# Patient Record
Sex: MALE | Race: BLACK OR AFRICAN AMERICAN
[De-identification: names, ages, dates, MRNs, and addresses within clinical notes are randomized per-mention and may not be internally consistent; named-entity substitution may affect disease eponyms.]

---

## 2018-12-26 NOTE — ER DOCUMENT REPORT
ED GI/





- General


Chief Complaint: Penile Discharge


Stated Complaint: PENIAL DRAINAGE


Time Seen by Provider: 12/26/18 19:59


Mode of Arrival: Ambulatory


Information source: Patient


Notes: 





Patient is a 28-year-old male comes emergency room 2 complaints his primary 

complaint is he believes he has an STD.  #2 complaint is his right ankle hurts. 

Patient states that his cold is been going out with he believes she is 

contracted an STD most likely chlamydia he is started with a penile discharge 

that is not quite as yellow as what he remembers in his younger age more of a 

whitish kind of a discharge.  It all started approximately 4 days ago.  Patient 

furthermore states that she is being treated as well.  The second thing is that 

he was at work today while lifting a mattress above his head was on a slight 

hill as he lifted his right ankle rolled on him inverted and complains of pain 

and swelling.  He denies any injuries or fall.  He continued throughout the day 

to finish work but it is swollen more since then.  Patient is requesting that we

treat him for his STD and also just treat his ankle he does not want an x-ray.


TRAVEL OUTSIDE OF THE [place] IN LAST 30 DAYS: No





- HPI


Patient complains to provider of: Other - Penile discharge.  No: Testicular pain


Onset: Other


Timing/Duration: Gradual - 4 days


Quality of pain: No pain


Severity at maximum: Mild


Severity in ED: Mild


Pain Level: 1


Location: Other - Penis.  No: Left testicle, Right testicle


Sexual history: Active, New partner, Unprotected intercourse, STD exposure


Associated symptoms: Dysuria, Penile discharge


Exacerbated by: Denies


Relieved by: Denies


Similar symptoms previously: Yes


Recently seen / treated by doctor: No





- Related Data


Allergies/Adverse Reactions: 


                                        





phenytoin [From Dilantin] Allergy (Verified 12/26/18 18:04)


   











Past Medical History





- General


Information source: Patient





- Social History


Smoking Status: Current Every Day Smoker


Cigarette use (# per day): Yes - 1 pack a day


Chew tobacco use (# tins/day): No


Smoking Education Provided: Yes


Frequency of alcohol use: None


Drug Abuse: None


Lives with: Alone


Family History: Reviewed & Not Pertinent


Patient has suicidal ideation: No


Patient has homicidal ideation: No


Renal/ Medical History: Denies: Hx Peritoneal Dialysis





Review of Systems





- Review of Systems


Constitutional: No symptoms reported


EENT: No symptoms reported


Cardiovascular: No symptoms reported


Respiratory: No symptoms reported


Gastrointestinal: No symptoms reported


Genitourinary: See HPI, Dysuria


Male Genitourinary: See HPI, Penile discharge


Musculoskeletal: See HPI, Joint pain, Joint swelling, Ankle swelling


Skin: No symptoms reported


Hematologic/Lymphatic: No symptoms reported


Neurological/Psychological: No symptoms reported


-: Yes All other systems reviewed and negative





Physical Exam





- Vital signs


Vitals: 


                                        











Temp Pulse Resp BP Pulse Ox


 


 98.7 F   81   17   114/62   100 


 


 12/26/18 18:32  12/26/18 18:32  12/26/18 18:32  12/26/18 18:32  12/26/18 18:32











Interpretation: Normal





- Notes


Notes: 





PHYSICAL EXAMINATION:





GENERAL:  patient is a well-nourished well-developed 28-year-old male who is in 

no apparent distress on physical examination.  He is however slightly anxious 

and embarrassed about his presentation.





HEAD: Atraumatic, normocephalic





NECK: Normal range of motion, supple without lymphadenopathy





LUNGS: Breath sounds clear to auscultation bilaterally and equal.  No wheezes 

rales or rhonchi.





HEART: Regular rate and rhythm without murmurs





ABDOMEN: Soft, nontender, nondistended abdomen.  No guarding, no rebound.  

Examination of patient's penis and testicles shows no discharge on present 

physical examination.  Patient had previously urinated and thus no discharge.  

Also there were no tenderness to palpation of the testicles.  They home evenly 

with a good cremaster reflex.  Bilaterally.





Musculoskeletal: Examination of patient's right ankle shows he has some mild 

swelling on the lateral malleoli are area.  He has increased tenderness on the 

anterior portion of the right ankle just above the lateral malleolus.  He has 

flexion extension of the foot with some discomfort and tenderness.  He has a 

good vascular examination of the right ankle and foot with good dorsalis pedal 

pulse good cap refill in the nailbeds of the toes.





NEUROLOGICAL:   Normal speech.  Normal sensory, motor exams 





PSYCH: Normal mood, normal affect.





SKIN: Warm, Dry, normal turgor, no rashes or lesions noted.





Course





- Re-evaluation


Re-evalutation: 





12/26/18 20:19


Patient is requested that we treat his STD since he has known exposure.  We will

do this with a 250 Rocephin IM as well as 1 g of Zithromax and 2 g of Flagyl.  I

will give him our number here to contact us tomorrow to find out whether it was 

positive or not.  He is also refused to have an x-ray performed.  Given that we 

are placing him in an Ace wrap and I have informed him that I cannot really tell

him if he has an avulsion fracture of the area or not.  At this point patient 

states he will use the Ace wrap take it off when he gets home he will purchase 

of those Ace socks at 1 of the pharmacy so he can just pull it on it and use it 

and as he gets worse he will return to ER he says.


12/27/18 01:47








- Vital Signs


Vital signs: 


                                        











Temp Pulse Resp BP Pulse Ox


 


 97.9 F   69   14   135/69 H  100 


 


 12/26/18 20:50  12/26/18 20:50  12/26/18 20:50  12/26/18 20:50  12/26/18 20:50














- Laboratory


Laboratory results interpreted by me: 


                                        











  12/26/18





  19:14


 


N.gonorrhoeae DNA (PCR)  DETECTED H














Procedures





- Immobilization


  ** Right Ankle


Time completed: 20:20


Pre-Proc Neuro Vasc Exam: Normal


Immobilizer type: Ace wrap


Performed by: PCT


Post-Proc Neuro Vasc Exam: Normal, Abnormal





Discharge





- Discharge


Clinical Impression: 


 Exposure to STD





Right ankle sprain


Qualifiers:


 Encounter type: initial encounter Involved ligament of ankle: unspecified 

ligament Qualified Code(s): S93.401A - Sprain of unspecified ligament of right 

ankle, initial encounter





Condition: Stable


Disposition: HOME, SELF-CARE


Instructions:  Gonorrhea ([facility]), Splint Precautions ([facility]), Sprain ([facility]), Sprained

Ankle ([facility])


Additional Instructions: 


Trichomonas Infection





     Trichomoniasis is infection of the vagina or male genital tract with 

Trichomonas vaginalis. It can be asymptomatic or cause urethritis, vaginitis, or

occasionally cystitis, epididymitis, or prostatitis. Diagnosis is by microscopic

examination of vaginal or prostatic secretions or by urethral culture. Patients 

and sex partners are treated with metronidazole.


     T. vaginalis is a flagellated, sexually transmitted protozoan that more 

often infects women (about 20% of women of reproductive age) than men. Infection

may be asymptomatic in either sex, but asymptomatic is the rule for men. In men,

protozoa may persist for long periods in the  tract without causing symptoms; 

thus, protozoa may be transmitted unwittingly to sex partners. Trichomoniasis 

may account for up to 5% of nongonococcal, nonchlamydial urethritis in men in 

some areas. Co-infection with gonorrhea and other sexually transmitted diseases 

(STDs) is common.


     In women, symptoms range from none to copious, yellow-green, frothy vaginal

discharge with soreness of the vulva and perineum, dyspareunia, and dysuria. 

Asymptomatic infection may become symptomatic at any time as the vulva and 

perineum become inflamed and edema develops in the labia. The vaginal walls and 

surface of the cervix may have punctate, red "strawberry" spots. Urethritis and 

possibly cystitis may also occur.


     Men are usually asymptomatic; however, sometimes urethritis results in a 

discharge that may be transient, frothy, or purulent or that causes dysuria and 

frequency, usually early in the morning. Often, urethritis is mild and causes 

only minimal urethral irritation and occasional moisture at the urethral meatus,

under the foreskin, or both. Epididymitis and prostatitis are rare 

complications.


     Trichomoniasis is suspected in women with vaginitis, in men with 

urethritis, and in their sex partners. Suspicion is high if symptoms persist 

after patients have been evaluated and treated for other infections such as 

gonorrhea and chlamydial, mycoplasmal, and ureaplasmal infections.


     In women, diagnosis is based on clinical criteria and in-office testing. 

The saline wet mount is examined microscopically as soon as possible to detect 

trichomonads.In men, microscopy of urine is insensitive, although occasionally 

organisms are visible in a first-voided morning specimen or a centrifuged 

specimen. Cultures of urine and urethral swabs are more sensitive.


     As with diagnosis of any STD, patients with trichomoniasis should be tested

to exclude other common STDs such as gonorrhea and chlamydial infection.


     Metronidazole or tinidazole 2 g po in a single dose cures up to 95% of 

women if sex partners are treated simultaneously. Effectiveness of single-dose 

regimens in men is not as clear, so treatment is typically with metronidazole or

tinidazole 500 mg bid for 5 to 7 days.


     Sex partners should be screened and treated for trichomoniasis and other 

STDs. If poor adherence to follow-up is likely, treatment can be initiated in 

sex partners of patients with documented trichomoniasis without confirming the 

diagnosis in the partner.


Chlamydia





     You may have a chlamydia infection.  Chlamydia is a germ that grows inside 

the cells of the mucous membranes.  It often infects the eyes, urethra, and 

fallopian tubes.  It can cause chronic pain and scar tissue if untreated.


     Antibiotics are used to treat chlamydia.  It's important to take all the 

medicine even if there are no symptoms. Use condoms to prevent spread of the 

infection.


     Because this infection can spread by sexual contact, it's important that 

your sexual partner be checked before resuming sexual relations.  A positive 

test for chlamydia has to be reported to the health department.


     Call the doctor or return at once if you develop increasing fever, rash, 

severe pelvic pain, vaginal bleeding (other than your period), or problems with 

your bladder or bowels.





For your ankle cytology without having an x-ray is difficult to see if there is 

a fractured or not.  However go to [facility] and get an Ace wrap that has the toes

cut out a bit and the heel cut out of it and slides on this will make it easier 

for you to slide it on and use it for work.  Ice down when you get home at night

3 or 3 times a day.  I am giving ibuprofen for the inflammation and swelling.  

You may also add Tylenol along with that if you would like.  Should you have 

have continued pain and discomfort return to ER for recheck.  Also on the STD 

treating you here is covered gonorrhea chlamydia and trichomonas we do not test 

for HIV you need to go to the health department for that.  Also both partners 

have to be treated to make certain that it is gone and you cannot have 

intercourse while you were both being treated you must wait 10 days after your 

treatment we start intercourse if you do not you we will just keep the plopping 

back and forth.





Prescriptions: 


Ibuprofen [Motrin 800 mg Tablet] 800 mg PO Q8H PRN #30 tab


 PRN Reason: 


Referrals: 


LUTHER JORGE MD [[facility]] - Follow up as needed

## 2019-03-11 NOTE — ER DOCUMENT REPORT
ED General





- General


Chief Complaint: STD Exposure


Stated Complaint: STD CHECK


Time Seen by Provider: 03/11/19 15:34


Mode of Arrival: Ambulatory


Information source: Patient


TRAVEL OUTSIDE OF THE [place] IN LAST 30 DAYS: No





- HPI


Patient complains to provider of: Dysuria, urethral discharge


Onset: Last week


Onset/Duration: Gradual


Quality of pain: No pain


Severity: None


Associated symptoms: denies: Chills, Diarrhea, Fever, Nausea, Vomiting


Exacerbated by: Denies


Relieved by: Denies


Similar symptoms previously: No


Recently seen / treated by doctor: No


Notes: 





28-year-old -American male coming in today with chief complaint of 

urethral discharge and dysuria for the past week or so.  Thinks he got infected 

with an STD.  History of gonorrhea and chlamydia in the past.  No fevers or 

shaking chills.





- Related Data


Allergies/Adverse Reactions: 


                                        





phenytoin [From Dilantin] Allergy (Verified 12/26/18 18:04)


   











Past Medical History





- General


Information source: Patient





- Social History


Smoking Status: Current Some Day Smoker


Family History: Reviewed & Not Pertinent


Renal/ Medical History: Denies: Hx Peritoneal Dialysis





Review of Systems





- Review of Systems


Notes: 





Constitutional:  No fevers. No chills.





EENT: No eye redness. No eye pain. No ear pain. No sore throat.





Cardiovascular:  No chest pain. No palpitations.





Respiratory: No cough. No shortness of breath. No respiratory distress.





Gastrointestinal: No abdominal pain. No nausea, vomiting, or diarrhea.





Genitourinary: Atraumatic.  Positive dysuria.  Positive urethral discharge





Musculoskeletal: Atraumatic. No swelling. No deformities.





Skin: No rash or lesions.





Lymphatic: No swollen lymph nodes.





Neurologic: No headache. No syncope.





Psychiatric: No suicidal or homicidal ideation.





Physical Exam





- Vital signs


Vitals: 





                                        











Temp Pulse Resp BP Pulse Ox


 


 98.2 F   74   16   128/75 H  99 


 


 03/11/19 14:32  03/11/19 14:32  03/11/19 14:32  03/11/19 14:32  03/11/19 14:32














- Notes


Notes: 





General: Well-developed, well-nourished. In no acute distress. Non-toxic 

appearing.





Cardiac: Well-perfused. Regular rate and rhythm. No murmurs, rubs, or gallops. 





Pulmonary: No respiratory distress. No cyanosis. Bilateral lung fiels are clear 

to auscultation.





Abdominal: Non-distended. Non-rigid. Bowels sounds are present in all four 

quadrants. No guarding or rebound.





HEENT: Head is atraumatic. Conjunctivae not reddened. No tearing. PERRL. EOMI. 

Orbits atraumatic. No periorbital swelling or erythema. Oropharynx is without 

erythema, swelling, or exudates.





Neck: Supple. No adenopathy. No meningismus.





Dermatologic: Warm with good turgor. No rash. Atraumatic.





Chest: Atraumatic. No chest wall tenderness to palpation.





Musculoskeletal: Moves all extremities well. No range of motion deficits. no 

muscular or joint tenderness. No paraspinal muscle tenderness. no midline spinal

tenderness or step-off.





Genitourinary: Examination deferred per patient request





Neurologic: No gross neurologic deficits.





Psychiatric: Normal mood. 











Course





- Vital Signs


Vital signs: 





                                        











Temp Pulse Resp BP Pulse Ox


 


 98.2 F   74   16   128/75 H  99 


 


 03/11/19 14:32  03/11/19 14:32  03/11/19 14:32  03/11/19 14:32  03/11/19 14:32














Discharge





- Discharge


Clinical Impression: 


 Urethritis





Condition: Good


Disposition: HOME, SELF-CARE


Instructions:  Urethritis ([facility])


Additional Instructions: 


Please be safe when having sexual relations.  Condoms can prevent pregnancy and 

passage of sexually transmitted infections.


Referrals: 


[facility] [Provider Group] - Follow up as needed

## 2019-11-09 ENCOUNTER — HOSPITAL ENCOUNTER (EMERGENCY)
Dept: HOSPITAL 62 - ER | Age: 29
LOS: 1 days | Discharge: OTHER INSTITUTION | End: 2019-11-10
Payer: COMMERCIAL

## 2019-11-09 VITALS — DIASTOLIC BLOOD PRESSURE: 76 MMHG | SYSTOLIC BLOOD PRESSURE: 122 MMHG

## 2019-11-09 DIAGNOSIS — Z53.21: Primary | ICD-10-CM

## 2019-11-09 DIAGNOSIS — R56.9: ICD-10-CM

## 2019-11-09 LAB
ADD MANUAL DIFF: NO
ALBUMIN SERPL-MCNC: 4.3 G/DL (ref 3.5–5)
ALP SERPL-CCNC: 48 U/L (ref 38–126)
ANION GAP SERPL CALC-SCNC: 9 MMOL/L (ref 5–19)
AST SERPL-CCNC: 28 U/L (ref 17–59)
BASOPHILS # BLD AUTO: 0.1 10^3/UL (ref 0–0.2)
BASOPHILS NFR BLD AUTO: 0.8 % (ref 0–2)
BILIRUB DIRECT SERPL-MCNC: 0.1 MG/DL (ref 0–0.4)
BILIRUB SERPL-MCNC: 0.2 MG/DL (ref 0.2–1.3)
BUN SERPL-MCNC: 10 MG/DL (ref 7–20)
CALCIUM: 9.7 MG/DL (ref 8.4–10.2)
CHLORIDE SERPL-SCNC: 107 MMOL/L (ref 98–107)
CO2 SERPL-SCNC: 27 MMOL/L (ref 22–30)
EOSINOPHIL # BLD AUTO: 0.1 10^3/UL (ref 0–0.6)
EOSINOPHIL NFR BLD AUTO: 0.8 % (ref 0–6)
ERYTHROCYTE [DISTWIDTH] IN BLOOD BY AUTOMATED COUNT: 13 % (ref 11.5–14)
GLUCOSE SERPL-MCNC: 82 MG/DL (ref 75–110)
HCT VFR BLD CALC: 43 % (ref 37.9–51)
HGB BLD-MCNC: 14.7 G/DL (ref 13.5–17)
LYMPHOCYTES # BLD AUTO: 1.9 10^3/UL (ref 0.5–4.7)
LYMPHOCYTES NFR BLD AUTO: 22.6 % (ref 13–45)
MCH RBC QN AUTO: 32.1 PG (ref 27–33.4)
MCHC RBC AUTO-ENTMCNC: 34.2 G/DL (ref 32–36)
MCV RBC AUTO: 94 FL (ref 80–97)
MONOCYTES # BLD AUTO: 0.5 10^3/UL (ref 0.1–1.4)
MONOCYTES NFR BLD AUTO: 5.7 % (ref 3–13)
NEUTROPHILS # BLD AUTO: 5.9 10^3/UL (ref 1.7–8.2)
NEUTS SEG NFR BLD AUTO: 70.1 % (ref 42–78)
PLATELET # BLD: 250 10^3/UL (ref 150–450)
POTASSIUM SERPL-SCNC: 4.6 MMOL/L (ref 3.6–5)
PROT SERPL-MCNC: 7.2 G/DL (ref 6.3–8.2)
RBC # BLD AUTO: 4.58 10^6/UL (ref 4.35–5.55)
TOTAL CELLS COUNTED % (AUTO): 100 %
WBC # BLD AUTO: 8.4 10^3/UL (ref 4–10.5)

## 2019-11-09 PROCEDURE — 99284 EMERGENCY DEPT VISIT MOD MDM: CPT

## 2019-11-09 PROCEDURE — 82962 GLUCOSE BLOOD TEST: CPT

## 2019-11-09 PROCEDURE — 80164 ASSAY DIPROPYLACETIC ACD TOT: CPT

## 2019-11-09 PROCEDURE — 36415 COLL VENOUS BLD VENIPUNCTURE: CPT

## 2019-11-09 PROCEDURE — 85025 COMPLETE CBC W/AUTO DIFF WBC: CPT

## 2019-11-09 PROCEDURE — 93005 ELECTROCARDIOGRAM TRACING: CPT

## 2019-11-09 PROCEDURE — 96365 THER/PROPH/DIAG IV INF INIT: CPT

## 2019-11-09 PROCEDURE — 93010 ELECTROCARDIOGRAM REPORT: CPT

## 2019-11-09 PROCEDURE — 80053 COMPREHEN METABOLIC PANEL: CPT

## 2019-11-09 NOTE — EKG REPORT
SEVERITY:- NORMAL ECG -

SINUS RHYTHM

ST ELEV, PROBABLE NORMAL EARLY REPOL PATTERN

:

Confirmed by: Nereida Flores 09-Nov-2019 21:31:24

## 2019-11-11 ENCOUNTER — HOSPITAL ENCOUNTER (EMERGENCY)
Dept: HOSPITAL 62 - ER | Age: 29
Discharge: HOME | End: 2019-11-11
Payer: COMMERCIAL

## 2019-11-11 VITALS — SYSTOLIC BLOOD PRESSURE: 123 MMHG | DIASTOLIC BLOOD PRESSURE: 80 MMHG

## 2019-11-11 DIAGNOSIS — F17.210: ICD-10-CM

## 2019-11-11 DIAGNOSIS — R56.9: Primary | ICD-10-CM

## 2019-11-11 LAB
ADD MANUAL DIFF: NO
ALBUMIN SERPL-MCNC: 4.2 G/DL (ref 3.5–5)
ALP SERPL-CCNC: 57 U/L (ref 38–126)
ANION GAP SERPL CALC-SCNC: 10 MMOL/L (ref 5–19)
APPEARANCE UR: CLEAR
APTT PPP: YELLOW S
AST SERPL-CCNC: 24 U/L (ref 17–59)
BARBITURATES UR QL SCN: NEGATIVE
BASOPHILS # BLD AUTO: 0.1 10^3/UL (ref 0–0.2)
BASOPHILS NFR BLD AUTO: 0.9 % (ref 0–2)
BILIRUB DIRECT SERPL-MCNC: 0.2 MG/DL (ref 0–0.4)
BILIRUB SERPL-MCNC: 0.6 MG/DL (ref 0.2–1.3)
BILIRUB UR QL STRIP: NEGATIVE
BUN SERPL-MCNC: 11 MG/DL (ref 7–20)
CALCIUM: 9.6 MG/DL (ref 8.4–10.2)
CHLORIDE SERPL-SCNC: 105 MMOL/L (ref 98–107)
CO2 SERPL-SCNC: 25 MMOL/L (ref 22–30)
EOSINOPHIL # BLD AUTO: 0 10^3/UL (ref 0–0.6)
EOSINOPHIL NFR BLD AUTO: 0.7 % (ref 0–6)
ERYTHROCYTE [DISTWIDTH] IN BLOOD BY AUTOMATED COUNT: 13.1 % (ref 11.5–14)
ETHANOL SERPL-MCNC: < 10 MG/DL
GLUCOSE SERPL-MCNC: 93 MG/DL (ref 75–110)
GLUCOSE UR STRIP-MCNC: NEGATIVE MG/DL
HCT VFR BLD CALC: 43.4 % (ref 37.9–51)
HGB BLD-MCNC: 15 G/DL (ref 13.5–17)
KETONES UR STRIP-MCNC: 20 MG/DL
LYMPHOCYTES # BLD AUTO: 1.7 10^3/UL (ref 0.5–4.7)
LYMPHOCYTES NFR BLD AUTO: 28 % (ref 13–45)
MCH RBC QN AUTO: 32.2 PG (ref 27–33.4)
MCHC RBC AUTO-ENTMCNC: 34.6 G/DL (ref 32–36)
MCV RBC AUTO: 93 FL (ref 80–97)
METHADONE UR QL SCN: NEGATIVE
MONOCYTES # BLD AUTO: 0.4 10^3/UL (ref 0.1–1.4)
MONOCYTES NFR BLD AUTO: 6.7 % (ref 3–13)
NEUTROPHILS # BLD AUTO: 3.8 10^3/UL (ref 1.7–8.2)
NEUTS SEG NFR BLD AUTO: 63.7 % (ref 42–78)
NITRITE UR QL STRIP: NEGATIVE
PCP UR QL SCN: NEGATIVE
PH UR STRIP: 8 [PH] (ref 5–9)
PLATELET # BLD: 260 10^3/UL (ref 150–450)
POTASSIUM SERPL-SCNC: 4.3 MMOL/L (ref 3.6–5)
PROT SERPL-MCNC: 7.4 G/DL (ref 6.3–8.2)
PROT UR STRIP-MCNC: NEGATIVE MG/DL
RBC # BLD AUTO: 4.66 10^6/UL (ref 4.35–5.55)
SP GR UR STRIP: 1.02
TOTAL CELLS COUNTED % (AUTO): 100 %
URINE AMPHETAMINES SCREEN: NEGATIVE
URINE BENZODIAZEPINES SCREEN: (no result)
URINE COCAINE SCREEN: NEGATIVE
URINE MARIJUANA (THC) SCREEN: (no result)
UROBILINOGEN UR-MCNC: NEGATIVE MG/DL (ref ?–2)
WBC # BLD AUTO: 6 10^3/UL (ref 4–10.5)

## 2019-11-11 PROCEDURE — 85025 COMPLETE CBC W/AUTO DIFF WBC: CPT

## 2019-11-11 PROCEDURE — 80185 ASSAY OF PHENYTOIN TOTAL: CPT

## 2019-11-11 PROCEDURE — 80164 ASSAY DIPROPYLACETIC ACD TOT: CPT

## 2019-11-11 PROCEDURE — 81001 URINALYSIS AUTO W/SCOPE: CPT

## 2019-11-11 PROCEDURE — 80053 COMPREHEN METABOLIC PANEL: CPT

## 2019-11-11 PROCEDURE — 36415 COLL VENOUS BLD VENIPUNCTURE: CPT

## 2019-11-11 PROCEDURE — 99285 EMERGENCY DEPT VISIT HI MDM: CPT

## 2019-11-11 PROCEDURE — 83735 ASSAY OF MAGNESIUM: CPT

## 2019-11-11 PROCEDURE — 80307 DRUG TEST PRSMV CHEM ANLYZR: CPT

## 2019-11-11 NOTE — ER DOCUMENT REPORT
Entered by LEORA STOVALL SCRIBE  11/11/19 0919 





Acting as scribe for:MOJGAN GONZALEZ MD





ED Seizure





- General


Chief Complaint: Seizure


Stated Complaint: POSSIBLE SEIZURE


Time Seen by Provider: 11/11/19 09:05


Mode of Arrival: Medic


Information source: Patient, [facility] Records, Outside Facility Records - [facility]


Notes: 





29-year-old male in custody of the [facility] 

presents to the emergency department today for a seizure.  Patient was seen here

x2 days ago as well for seizures.  Patient's valproic acid level x2 days ago was

22.1.  Patient states he has been taking the depakote since discharge.  The 

history two days ago was very convoluted and difficult to obtain, but he stated 

that he had not been taking his medications daily at that point.





- Related Data


Allergies/Adverse Reactions: 


                                        





phenytoin [From Dilantin] Allergy (Verified 12/26/18 18:04)


   








Home Medications: depakote





Past Medical History





- General


Information source: Patient, [facility] Records





- Social History


Smoking Status: Current Some Day Smoker


Cigarette use (# per day): Yes - 1ppd when not incarcerated


Lives with: Other - residential


Family History: Reviewed & Not Pertinent


Patient has suicidal ideation: No


Patient has homicidal ideation: No


Neurological Medical History: Reports: Hx Seizures





Review of Systems





- Review of Systems


Constitutional: No symptoms reported


EENT: No symptoms reported


Cardiovascular: No symptoms reported


Respiratory: No symptoms reported


Gastrointestinal: No symptoms reported


Genitourinary: No symptoms reported


Male Genitourinary: No symptoms reported


Musculoskeletal: No symptoms reported


Skin: No symptoms reported


Hematologic/Lymphatic: No symptoms reported


Neurological/Psychological: See HPI, Seizure


-: Yes All other systems reviewed and negative





Physical Exam





- Vital signs


Vitals: 


                                        











Pulse Ox


 


 98 


 


 11/11/19 08:27














- Notes


Notes: 





Physical Exam:


 


General: Alert, appears well, in handcuffs in custody of the [facility]. 


 


HEENT: Normocephalic. Atraumatic. PERRL. Extraocular movements intact. 

Oropharynx clear. No tongue biting/chewing. 


 


Neck: Supple. Non-tender.


 


Respiratory: No respiratory distress. Clear and equal breath sounds bilaterally.


 


Cardiovascular: Regular rate and rhythm. 


 


Abdominal: Normal Inspection. Non-tender. No distension. Normal Bowel Sounds. 


 


Back: No gross abnormalities. 


 


Extremities: Moves all four extremities.


Upper extremities: Normal inspection. Normal ROM.  


Lower extremities: Normal inspection. No edema. Normal ROM.


 


Neurological: Normal cognition. AAOx4. Normal speech.  


 


Psychological: Normal affect. Normal Mood. 


 


Skin: Warm. Dry. Normal color.





Course





- Re-evaluation


Re-evalutation: 





11/11/19 10:43


Depakote level is only 23.9 today, it would appear that he has not been 

receiving the medication while he is in the [facility].





- Vital Signs


Vital signs: 


                                        











Temp Pulse Resp BP Pulse Ox


 


 99.0 F      16   137/76 H  99 


 


 11/11/19 08:28     11/11/19 08:28  11/11/19 08:28  11/11/19 08:28














- Laboratory


Result Diagrams: 


                                 11/11/19 08:30





                                 11/11/19 08:30


Laboratory results interpreted by me: 


                                        











  11/11/19 11/11/19





  08:30 08:30


 


Phenytoin  < 3.0 L 


 


Valproic Acid   23.9 L














Discharge





- Discharge


Clinical Impression: 


 Seizure, History of seizures





Condition: Stable


Disposition: HOME, SELF-CARE


Additional Instructions: 


Seizure, Known Epileptic





     You have had a seizure.  Seizures may "break through" in an epileptic due 

to stress of infection or injury, a change in blood chemistry, or drug and 

alcohol use.  Another common cause is failure to take medication as prescribed.


     Your doctor has evaluated your situation for the likely cause of this 

seizure.  It is important that you follow his advice concerning any medication 

changes and follow-up care.  Further testing of anti-seizure medication levels 

in your blood may be necessary.


     If you have a 's license, it's important that you DO NOT DRIVE until 

given permission by your physician.  This seizure must be reported to the 

's license bureau.


     Call the doctor or return if seizures recur, or if new or unusual symptoms 

arise -- such as severe headache, confusion, excessive sleepiness, local 

weakness or numbness, neck stiffness, or fever.








**********************

*************************************************************************





Take the Depakote as prescribed today.


Do not take the prescription that was provided to you 2 days ago, as it is 

unclear if this medication was ever filled or if they are providing it to you.





RETURN TO THE EMERGENCY ROOM IF ANY NEW OR WORSENING SYMPTOMS.








Prescriptions: 


Divalproex Sodium [Depakote] 500 mg PO BID 30 Days #60 tablet.dr John Attestation: 





11/11/19 09:46


I personally performed the services described in the documentation, reviewed and

edited the documentation which was dictated to the scribe in my presence, and it

accurately records my words and actions.





I personally performed the services described in the documentation, reviewed and

edited the documentation which was dictated to the scribe in my presence, and it

accurately records my words and actions.

## 2020-01-14 NOTE — ER DOCUMENT REPORT
Entered by LEORA STOVALL SCRIBE  11/09/19 2024 





Acting as scribe for:PREM OLIVER MD





ED Seizure





- General


Chief Complaint: Seizure


Stated Complaint: POSSIBLE SEIZURE


Information source: Patient


Notes: 





29 year old male in custody of the [facility] department that 

presents to the emergency department today with complaints of a seizure while in

long term today.  According to long term records, the patient was incarcerated earlier 

this morning and had a seizure prior to arrival tonight.  When reviewing the 

external pharmacy database the patient has been filling his Depakote although he

states he has not had it.  Patient reports being minimally compliant with his 

seizure medications.  Patient is a poor historian so history is limited.





- Related Data


Allergies/Adverse Reactions: 


                                        





phenytoin [From Dilantin] Allergy (Verified 12/26/18 18:04)


   











Past Medical History





- General


Information source: Patient





- Social History


Smoking Status: Unknown if Ever Smoked


Lives with: Other - prison


Family History: Reviewed & Not Pertinent


Patient has suicidal ideation: No


Patient has homicidal ideation: No


Neurological Medical History: Reports: Hx Seizures





Review of Systems





- Review of Systems


Constitutional: No symptoms reported


EENT: No symptoms reported


Cardiovascular: No symptoms reported


Respiratory: No symptoms reported


Gastrointestinal: No symptoms reported


Genitourinary: No symptoms reported


Male Genitourinary: No symptoms reported


Musculoskeletal: No symptoms reported


Skin: No symptoms reported


Hematologic/Lymphatic: No symptoms reported


Neurological/Psychological: See HPI, Seizure


-: Yes All other systems reviewed and negative





Physical Exam





- Vital signs


Vitals: 


                                        











Temp Resp BP Pulse Ox


 


 98 F   17   120/73   99 


 


 11/09/19 19:10  11/09/19 19:10  11/09/19 19:10  11/09/19 19:10














- Notes


Notes: 





Physical Exam:


 


General: Sleeping but arouses easily.


 


HEENT: Normocephalic. Atraumatic. PERRL. Extraocular movements intact. 

Oropharynx clear.


 


Neck: Supple. Non-tender.


 


Respiratory: No respiratory distress. Clear and equal breath sounds bilaterally.


 


Cardiovascular: Regular rate and rhythm. 


 


Abdominal: Normal Inspection. Non-tender. No distension. Normal Bowel Sounds. 


 


Back: Left upper thoracic paraspinal tenderness with palpation. no step offs or 

crepitus. 





Extremities: Moves all four extremities.


Upper extremities: Normal inspection. Normal ROM.  


Lower extremities: Normal inspection. No edema. Normal ROM.


 


Neurological: Normal cognition. AAOx4. Normal speech.  


 


Psychological: Normal affect. Normal Mood. 


 


Skin: Warm. Dry. Normal color.





Course





- Re-evaluation


Re-evalutation: 





11/09/19 22:03


Patient's Depakote level is 22.  Calculating his loading dose that should be 

1200.  Will low-dose patient at this time.


11/09/19 22:18


Reviewing external pharmacy records patient had approximately 4 days ago 

Depakote extended release filled instructions take to 500 mg extended release 

pills a day.  He was only filled for 5 days.  Upon talking to the deputy who is 

watching over the patient this time he states that the patient is going to be 

transferred from their facility soon and that he may not been receiving his 

medications as prescribed.


11/09/19 22:22


Patient sleeping comfortably alert to verbal stimuli remains alert oriented x3. 

His labs are within normal limits are nonsignificant.  EKG shows no concerning 

findings.  Anticipate discharge back to custodial after loading dose administered


11/09/19 23:10





11/10/19 00:06


Patient remains alert oriented able to ambulate.  Will provide 7 days a 

prescription based on external pharmacy records.





- Vital Signs


Vital signs: 


                                        











Temp Pulse Resp BP Pulse Ox


 


 98 F      15   122/76   99 


 


 11/09/19 19:10     11/09/19 23:01  11/09/19 23:01  11/09/19 23:01














- Laboratory


Result Diagrams: 


                                 11/09/19 19:14





                                 11/09/19 19:14


Laboratory results interpreted by me: 


                                        











  11/09/19





  19:14


 


Valproic Acid  22.1 L














- EKG Interpretation by Me


Additional EKG results interpreted by me: 





11/09/19 22:22


Time 1908


Rate of 69, normal sinus rhythm, diffuse early repolarization, normal axis and 

intervals





Discharge





- Discharge


Clinical Impression: 


 History of seizures, Observed seizure-like activity





Condition: Good


Disposition: OTHER


Instructions:  Seizure, Known Epileptic ([facility])


Additional Instructions: 


You have been given 7 days of your Depakote medication.  You have to get 

additional days refill at facility you will be incarcerated


Prescriptions: 


Divalproex Sodium [Depakote ER] 500 mg PO DAILY 7 Days #14 [id number]





I personally performed the services described in the documentation, reviewed and

edited the documentation which was dictated to the scribe in my presence, and it

accurately records my words and actions.